# Patient Record
Sex: MALE | Race: WHITE
[De-identification: names, ages, dates, MRNs, and addresses within clinical notes are randomized per-mention and may not be internally consistent; named-entity substitution may affect disease eponyms.]

---

## 2017-10-01 ENCOUNTER — HOSPITAL ENCOUNTER (EMERGENCY)
Dept: HOSPITAL 83 - ED | Age: 47
Discharge: HOME | End: 2017-10-01
Payer: COMMERCIAL

## 2017-10-01 VITALS — WEIGHT: 200 LBS | BODY MASS INDEX: 31.39 KG/M2 | HEIGHT: 66.97 IN

## 2017-10-01 DIAGNOSIS — Z79.899: ICD-10-CM

## 2017-10-01 DIAGNOSIS — R22.0: Primary | ICD-10-CM

## 2019-01-01 ENCOUNTER — HOSPITAL ENCOUNTER (INPATIENT)
Dept: HOSPITAL 83 - ED | Age: 49
LOS: 1 days | Discharge: HOME | DRG: 915 | End: 2019-01-02
Attending: INTERNAL MEDICINE | Admitting: INTERNAL MEDICINE
Payer: COMMERCIAL

## 2019-01-01 VITALS — DIASTOLIC BLOOD PRESSURE: 82 MMHG | SYSTOLIC BLOOD PRESSURE: 122 MMHG

## 2019-01-01 VITALS — DIASTOLIC BLOOD PRESSURE: 70 MMHG | SYSTOLIC BLOOD PRESSURE: 110 MMHG

## 2019-01-01 VITALS — DIASTOLIC BLOOD PRESSURE: 84 MMHG

## 2019-01-01 VITALS — DIASTOLIC BLOOD PRESSURE: 82 MMHG

## 2019-01-01 VITALS — BODY MASS INDEX: 35.7 KG/M2 | HEIGHT: 65.98 IN | WEIGHT: 222.12 LBS

## 2019-01-01 VITALS — SYSTOLIC BLOOD PRESSURE: 129 MMHG | DIASTOLIC BLOOD PRESSURE: 59 MMHG

## 2019-01-01 VITALS — DIASTOLIC BLOOD PRESSURE: 80 MMHG | SYSTOLIC BLOOD PRESSURE: 120 MMHG

## 2019-01-01 VITALS — DIASTOLIC BLOOD PRESSURE: 76 MMHG

## 2019-01-01 VITALS — DIASTOLIC BLOOD PRESSURE: 72 MMHG

## 2019-01-01 DIAGNOSIS — J96.01: ICD-10-CM

## 2019-01-01 DIAGNOSIS — E87.2: ICD-10-CM

## 2019-01-01 DIAGNOSIS — D72.829: ICD-10-CM

## 2019-01-01 DIAGNOSIS — T78.3XXA: Primary | ICD-10-CM

## 2019-01-01 DIAGNOSIS — K21.9: ICD-10-CM

## 2019-01-01 DIAGNOSIS — Z82.49: ICD-10-CM

## 2019-01-01 DIAGNOSIS — R79.82: ICD-10-CM

## 2019-01-01 LAB
ALBUMIN SERPL-MCNC: 3.5 GM/DL (ref 3.1–4.5)
ALP SERPL-CCNC: 77 U/L (ref 45–117)
ALT SERPL W P-5'-P-CCNC: 34 U/L (ref 12–78)
AST SERPL-CCNC: 26 IU/L (ref 3–35)
BASOPHILS # BLD AUTO: 0 10*3/UL (ref 0–0.1)
BASOPHILS NFR BLD AUTO: 0.3 % (ref 0–1)
BUN SERPL-MCNC: 8 MG/DL (ref 7–24)
CHLORIDE SERPL-SCNC: 107 MMOL/L (ref 98–107)
CREAT SERPL-MCNC: 0.86 MG/DL (ref 0.7–1.3)
EOSINOPHIL # BLD AUTO: 0.2 10*3/UL (ref 0–0.4)
EOSINOPHIL # BLD AUTO: 3 % (ref 1–4)
ERYTHROCYTE [DISTWIDTH] IN BLOOD BY AUTOMATED COUNT: 13.2 % (ref 0–14.5)
HCT VFR BLD AUTO: 43.3 % (ref 42–52)
HGB BLD-MCNC: 15.1 G/DL (ref 14–18)
LYMPHOCYTES # BLD AUTO: 2.4 10*3/UL (ref 1.3–4.4)
LYMPHOCYTES NFR BLD AUTO: 33 % (ref 27–41)
MCH RBC QN AUTO: 33.6 PG (ref 27–31)
MCHC RBC AUTO-ENTMCNC: 34.9 G/DL (ref 33–37)
MCV RBC AUTO: 96.2 FL (ref 80–94)
MONOCYTES # BLD AUTO: 0.5 10*3/UL (ref 0.1–1)
MONOCYTES NFR BLD MANUAL: 7 % (ref 3–9)
NEUT #: 4.1 10*3/UL (ref 2.3–7.9)
NEUT %: 56.4 % (ref 47–73)
NRBC BLD QL AUTO: 0 10*3/UL (ref 0–0)
PLATELET # BLD AUTO: 212 10*3/UL (ref 130–400)
PMV BLD AUTO: 10.7 FL (ref 9.6–12.3)
POTASSIUM SERPL-SCNC: 3.5 MMOL/L (ref 3.5–5.1)
PROT SERPL-MCNC: 7.8 GM/DL (ref 6.4–8.2)
RBC # BLD AUTO: 4.5 10*6/UL (ref 4.5–5.9)
SODIUM SERPL-SCNC: 140 MMOL/L (ref 136–145)
TROPONIN I SERPL-MCNC: < 0.015 NG/ML (ref ?–0.04)
WBC NRBC COR # BLD AUTO: 7.2 10*3/UL (ref 4.8–10.8)

## 2019-01-01 NOTE — NUR
A 48, admitted to [facility], under the
services of JAIRO Robertson MD with a diagnosis of RESP DISTRESS W/
ANGIOEDEMA.
Chief complaint is awoke with sore throat then realized that it was more than
that. 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
which he did yesterday and finished up at 1500 12/31/18 then had a party in
which he drank about a 12 pk lst night. Was OK until awoke this AM.
Patient arrived via stretcher from ER.
Monitor applied. Initial assessment completed.
Vital signs taken and recorded.
JAIRO ROBERTSON MD notified of admission to the unit.
Orders received.
See assessment for past medical history, medications
and allergies.
Patient and/or family oriented to unit. [facility]
visitation policy reviewed.
Clothing/patient valuable form completed.
 
JAMEL ZEE

## 2019-01-01 NOTE — EKG
[place], [place]
 
                               ELECTROCARDIOGRAM REPORT
 
        NAME: TATI MIX                     ACCT #: [id number]  
        UNIT #: [id number]                        ROOM: [facility]    
        DOCTOR: CHINYERE DRAFT REPORT          BIRTHDATE: [birth year]
 
 
 

 
 
                           [facility]
                                       
Test Date:    2019               Test Time:    07:42:06
Pat Name:     TATI MIX               Department:   
Patient ID:   [id number]             Room:         [facility]
Gender:       M                        Technician:   
:          1970               Requested By: AUSTIN QUICK
Order Number: [id number]      Reading MD:   Chris Martini MD
                                 Measurements
Intervals                              Axis          
Rate:         98                       P:            53
IA:           129                      QRS:          72
QRSD:         94                       T:            -12
QT:           380                                    
QTc:          486                                    
                           Interpretive Statements
Sinus rhythm
Multiple ventricular premature complexes
Borderline T abnormalities, inferior leads
Borderline prolonged QT interval
No previous ECG available for comparison
 
Electronically Signed On 1-3-2019 8:25:43 PST by Chris Martini MD
 
CM:EKGRPT:ELECTROCARDIOGRAM REPORT
 
D: 19 0742
T: 19 0825
    
AUSTIN WINTER DRAFT REPORT         
AUSTIN QUICK DO

## 2019-01-02 VITALS — DIASTOLIC BLOOD PRESSURE: 60 MMHG

## 2019-01-02 VITALS — DIASTOLIC BLOOD PRESSURE: 61 MMHG | SYSTOLIC BLOOD PRESSURE: 98 MMHG

## 2019-01-02 VITALS — SYSTOLIC BLOOD PRESSURE: 115 MMHG | DIASTOLIC BLOOD PRESSURE: 67 MMHG

## 2019-01-02 VITALS — DIASTOLIC BLOOD PRESSURE: 70 MMHG | SYSTOLIC BLOOD PRESSURE: 130 MMHG

## 2019-01-02 LAB
BUN SERPL-MCNC: 16 MG/DL (ref 7–24)
CHLORIDE SERPL-SCNC: 110 MMOL/L (ref 98–107)
CREAT SERPL-MCNC: 1.11 MG/DL (ref 0.7–1.3)
ERYTHROCYTE [DISTWIDTH] IN BLOOD BY AUTOMATED COUNT: 13.4 % (ref 0–14.5)
HCT VFR BLD AUTO: 46.1 % (ref 42–52)
HGB BLD-MCNC: 15.6 G/DL (ref 14–18)
MCH RBC QN AUTO: 33 PG (ref 27–31)
MCHC RBC AUTO-ENTMCNC: 33.8 G/DL (ref 33–37)
MCV RBC AUTO: 97.5 FL (ref 80–94)
NRBC BLD QL AUTO: 0 10*3/UL (ref 0–0)
PLATELET # BLD AUTO: 226 10*3/UL (ref 130–400)
PLATELET SUFFICIENCY: NORMAL
PMV BLD AUTO: 10.9 FL (ref 9.6–12.3)
POTASSIUM SERPL-SCNC: 4.2 MMOL/L (ref 3.5–5.1)
RBC # BLD AUTO: 4.73 10*6/UL (ref 4.5–5.9)
RBC MORPH BLD: NORMAL
SODIUM SERPL-SCNC: 142 MMOL/L (ref 136–145)
TOTAL CELLS COUNTED: 100 #CELLS
WBC NRBC COR # BLD AUTO: 20.6 10*3/UL (ref 4.8–10.8)

## 2019-01-02 NOTE — NUR
DISCHARGE INSTRUCTIONS TO PT. MONITOR AND HEP LOCK REMOVED. PT DISCHARGED
AMBULATORY IN STABLE CONDITION. HE UNDERSTANDS HE NEEDS TO CALL DR HERZOG'S
OFFICE FOR FOLLOW UP AND THAT THERE ARE PRESCRIPTIONS AT [facility] FOR HIM.

## 2022-06-13 ENCOUNTER — HOSPITAL ENCOUNTER (EMERGENCY)
Dept: HOSPITAL 83 - ED | Age: 52
Discharge: HOME | End: 2022-06-13
Payer: COMMERCIAL

## 2022-06-13 VITALS — BODY MASS INDEX: 36.1 KG/M2 | HEIGHT: 66.97 IN | WEIGHT: 230 LBS

## 2022-06-13 DIAGNOSIS — J20.9: Primary | ICD-10-CM

## 2022-06-13 DIAGNOSIS — Z79.899: ICD-10-CM

## 2022-06-13 DIAGNOSIS — Z20.822: ICD-10-CM

## 2022-08-02 NOTE — NUR
in to talk to patient.
Patient states lives at home with his wife and mother.
There are 2 steps in the home.
Physician: Dr. Luciano
Pharmacy: [facility]
Home health services: none
Patient's level of ADLs: INDEPENDENT
Patient has working utilities: yes
DME: none
Follow-up physician's appointment after d/c: he prefers to make his own follow
up appt after discharge
Does patient want to access PORTAL?: no
Discharge plan discussed with patient. He lives at home with his wife and
mother. He is independent in his ADLs and ambulation. Discussed home health
care services and he denies any home needs at this time. When medically stable
he will be discharged to home.
 
DIDIER EATON Unable to answer due to medical condition/unresponsive/etc...

## 2023-04-03 ENCOUNTER — HOSPITAL ENCOUNTER (EMERGENCY)
Dept: HOSPITAL 83 - ED | Age: 53
Discharge: HOME | End: 2023-04-03
Payer: COMMERCIAL

## 2023-04-03 VITALS — WEIGHT: 225 LBS | HEIGHT: 67.99 IN | BODY MASS INDEX: 34.1 KG/M2

## 2023-04-03 DIAGNOSIS — F17.220: ICD-10-CM

## 2023-04-03 DIAGNOSIS — Z20.822: ICD-10-CM

## 2023-04-03 DIAGNOSIS — J06.9: Primary | ICD-10-CM

## 2023-04-03 DIAGNOSIS — Z98.890: ICD-10-CM

## 2023-04-03 DIAGNOSIS — Z88.8: ICD-10-CM

## 2023-04-03 DIAGNOSIS — K21.9: ICD-10-CM

## 2023-04-03 DIAGNOSIS — F41.9: ICD-10-CM
